# Patient Record
Sex: FEMALE | Race: WHITE | Employment: OTHER | URBAN - METROPOLITAN AREA
[De-identification: names, ages, dates, MRNs, and addresses within clinical notes are randomized per-mention and may not be internally consistent; named-entity substitution may affect disease eponyms.]

---

## 2022-07-18 ENCOUNTER — OFFICE VISIT (OUTPATIENT)
Dept: ORTHOPEDIC SURGERY | Age: 77
End: 2022-07-18
Payer: MEDICARE

## 2022-07-18 VITALS
HEIGHT: 69 IN | WEIGHT: 242 LBS | TEMPERATURE: 97.7 F | HEART RATE: 86 BPM | OXYGEN SATURATION: 96 % | BODY MASS INDEX: 35.84 KG/M2

## 2022-07-18 DIAGNOSIS — M17.12 PRIMARY OSTEOARTHRITIS OF LEFT KNEE: Primary | ICD-10-CM

## 2022-07-18 PROCEDURE — 1123F ACP DISCUSS/DSCN MKR DOCD: CPT | Performed by: PHYSICIAN ASSISTANT

## 2022-07-18 PROCEDURE — 20610 DRAIN/INJ JOINT/BURSA W/O US: CPT | Performed by: PHYSICIAN ASSISTANT

## 2022-07-18 PROCEDURE — 99214 OFFICE O/P EST MOD 30 MIN: CPT | Performed by: PHYSICIAN ASSISTANT

## 2022-07-18 RX ORDER — MULTIVIT WITH MINERALS/LUTEIN
TABLET ORAL
COMMUNITY

## 2022-07-18 RX ORDER — ROSUVASTATIN CALCIUM 5 MG/1
TABLET, COATED ORAL
COMMUNITY
Start: 2022-04-30

## 2022-07-18 RX ORDER — OLMESARTAN MEDOXOMIL AND HYDROCHLOROTHIAZIDE 40/12.5 40; 12.5 MG/1; MG/1
TABLET ORAL
COMMUNITY
Start: 2022-04-30

## 2022-07-18 RX ORDER — AMLODIPINE BESYLATE 10 MG/1
TABLET ORAL
COMMUNITY

## 2022-07-18 RX ORDER — VITAMIN E 268 MG
CAPSULE ORAL
COMMUNITY

## 2022-07-18 RX ORDER — ANASTROZOLE 1 MG/1
TABLET ORAL
COMMUNITY
Start: 2022-01-24

## 2022-07-18 RX ORDER — LEVOTHYROXINE SODIUM 0.05 MG/1
TABLET ORAL
COMMUNITY
Start: 2022-04-30

## 2022-07-18 RX ORDER — TROSPIUM CHLORIDE 20 MG/1
TABLET, FILM COATED ORAL
COMMUNITY
Start: 2022-04-30

## 2022-07-18 RX ORDER — LIDOCAINE HYDROCHLORIDE 10 MG/ML
8 INJECTION, SOLUTION INFILTRATION; PERINEURAL ONCE
Status: COMPLETED | OUTPATIENT
Start: 2022-07-18 | End: 2022-07-18

## 2022-07-18 RX ORDER — TRIAMCINOLONE ACETONIDE 40 MG/ML
80 INJECTION, SUSPENSION INTRA-ARTICULAR; INTRAMUSCULAR ONCE
Status: COMPLETED | OUTPATIENT
Start: 2022-07-18 | End: 2022-07-18

## 2022-07-18 RX ORDER — HYDROCHLOROTHIAZIDE 25 MG/1
TABLET ORAL
COMMUNITY

## 2022-07-18 RX ADMIN — TRIAMCINOLONE ACETONIDE 80 MG: 40 INJECTION, SUSPENSION INTRA-ARTICULAR; INTRAMUSCULAR at 12:13

## 2022-07-18 RX ADMIN — LIDOCAINE HYDROCHLORIDE 8 ML: 10 INJECTION, SOLUTION INFILTRATION; PERINEURAL at 12:10

## 2022-07-18 ASSESSMENT — ENCOUNTER SYMPTOMS
EYES NEGATIVE: 1
RESPIRATORY NEGATIVE: 1
GASTROINTESTINAL NEGATIVE: 1

## 2022-07-18 NOTE — PROGRESS NOTES
A timeout was performed immediately prior to the start of the injection procedure and included the correct patient (two identifiers), correct procedure and correct site(s). Procedure consent and allergies were also verified. Patient's name, date of birth, and allergies have been confirmed. Patient is aware that injection is to be given in Left knee. He/she is aware that they will be seeing [facility] and the injection will be given by him.

## 2022-07-18 NOTE — PROGRESS NOTES
Amy Chaudhary (:  1945) is a 69 y.o. female,Established patient, here for evaluation of the following chief complaint(s):  New Patient (Pt would like a cortisone injection. Pt denies any recent injury)         ASSESSMENT/PLAN:  1. Primary osteoarthritis of left knee  -     [place] ARTHROCENTESIS ASPIR&/INJ MAJOR JT/BURSA W/O US      Return in about 1 week (around 2022) for Viscolubricant injection. Subjective   SUBJECTIVE/OBJECTIVE:  This is a 69-year-old female with complaint of left knee pain. I seen her previously for left knee osteoarthritis. I have injected the knee previously with cortisone. States the knee is swollen today. It has been increasingly more swollen primarily at the end of the day. She is greater than 6 months from most recent cortisone and Visco lubricant injections. She presents today requesting intra-articular cortisone injection of the left knee. Review of Systems   Constitutional: Negative. HENT: Negative. Eyes: Negative. Respiratory: Negative. Gastrointestinal: Negative. Genitourinary: Negative. Musculoskeletal: Negative. Psychiatric/Behavioral: Negative. Objective   IMAGING: Previous imaging performed on May 31, 2021, and available in the [facility] health record, showed lateral compartment degenerative arthritis. Physical Exam  Constitutional:       Appearance: Normal appearance. HENT:      Head: Normocephalic and atraumatic. Mouth/Throat:      Mouth: Mucous membranes are moist.   Eyes:      Extraocular Movements: Extraocular movements intact. Musculoskeletal:      Cervical back: Normal range of motion. Comments: Left knee-joint effusion present. Popliteal cyst present. No warmth, erythema, fluctuance or sign of infection. Full extension, flexion is 130 degrees. The knee joint is stable, there is no laxity with valgus or varus stress. Tenderness with palpation to lateral femoral condyle.   Patellofemoral crepitus with range of motion. Calf is soft and nontender. Skin:     General: Skin is warm and dry. Neurological:      General: No focal deficit present. Mental Status: She is oriented to person, place, and time. Psychiatric:         Mood and Affect: Mood normal.     Time Out  [x] Patient Verified  [x] Site Verified  [x] Laterality Verified  [x] Procedure Verified    Before aspiration/injection risks/benefits of a cortisone injection including infection, local skin irritation, skin atrophy, continued pain/discomfort, elevated blood sugar, burning, failure to relieve pain, possible late infection were discussed with patient. Patient verbalized understanding and wanted to proceed with planned procedure. After prepping and draping the left knee in the usual sterile fashion, 2 cc of 40 mg/ml kenalog with 8 cc of 1% lidocaine were injected intra-articularly after aspiration of 34 cc clear yellow joint fluid without any complications. Patient tolerated this well. Post-procedure discomfort can be alleviated with additional medications/ice/elevation/rest over the first 24 hours as recommended. The patient tolerated the procedure well. If fluid was aspirated it was sent for further studies  in sterile specimen container as ordered to the lab    Explained to the patient that she does have significant degenerative arthritis of the lateral compartment of the left knee. She has responded well deviously to cortisone as well as Visco lubricant. Injected the knee today with cortisone. She will follow-up with me in a week and will proceed with Visco lubricant injection       On this date 7/18/2022 I have spent 25 minutes reviewing previous notes, test results and face to face with the patient discussing the diagnosis and importance of compliance with the treatment plan as well as documenting on the day of the visit. An electronic signature was used to authenticate this note.     --DHAVAL Gallego

## 2022-07-18 NOTE — PATIENT INSTRUCTIONS
Patient Education        Learning About Joint Injections  What are joint injections? Joint injections are shots into a joint, such as the knee or shoulder. They are used to put in medicines, such as pain relievers and steroid medicines. Steroids can help reduce inflammation. A steroid shot can sometimes help withshort-term pain relief when other treatments haven't worked. How are they done? First, the area over the joint will be cleaned. Your doctor may then use a tinyneedle to numb the skin in the area where you will get the joint injection. If a tiny needle is used to numb the area, your doctor will use another needle to inject the medicine. Your doctor may use a pain reliever, a steroid, orboth. You may feel some pressure or discomfort. Your doctor may put ice on the area before you go home. What can you expect after a joint injection? You will probably go home soon after your shot. You may have numbness aroundthe joint for a few hours. If your shot included both a pain reliever and a steroid, then the pain will probably go away right away. But it might come back after a few hours. This might happen if the pain reliever wears off and the steroid hasn't started to work yet. Steroids don't always work. But when they do, the pain relief canlast for several days to a few months or longer. Your doctor may tell you to use ice on the area. You can also use ice if the pain comes back. Put ice or a cold pack on your joint for 10 to 20 minutes at atime. Put a thin cloth between the ice and your skin. Follow your doctor's instructions carefully. Follow-up care is a key part of your treatment and safety. Be sure to make and go to all appointments, and call your doctor if you are having problems. It's also a good idea to know your test results and keep alist of the medicines you take. Where can you learn more? Go to [email or web address] org and sign in to your Quick Key account.  Enter H585 in the Search Health Information box to learn more about \"Learning About Joint Injections. \"     If you do not have an account, please click on the \"Sign Up Now\" link. Current as of: March 9, 2022               Content Version: 13.3  © 4766-9220 [employer]. Care instructions adapted under license by [facility] ([facility]). If you have questions about a medical condition or this instruction, always ask your healthcare professional. [address] any warranty or liability for your use of this information.

## 2022-07-18 NOTE — PROGRESS NOTES
Patient's name, date of birth, and allergies have been confirmed. Patient is aware that injection is to be given in Left knee. He/she is aware that they will be seeing  Dara Mendoza   and the injection will be given by him. A timeout was performed immediately prior to the start of the injection procedure and included the correct patient (two identifiers), correct procedure and correct site(s). Procedure consent and allergies were also verified.

## 2022-07-26 ENCOUNTER — PROCEDURE VISIT (OUTPATIENT)
Dept: ORTHOPEDIC SURGERY | Age: 77
End: 2022-07-26
Payer: MEDICARE

## 2022-07-26 VITALS
HEART RATE: 86 BPM | TEMPERATURE: 97 F | OXYGEN SATURATION: 96 % | HEIGHT: 69 IN | WEIGHT: 242 LBS | BODY MASS INDEX: 35.84 KG/M2

## 2022-07-26 DIAGNOSIS — M17.12 PRIMARY OSTEOARTHRITIS OF LEFT KNEE: Primary | ICD-10-CM

## 2022-07-26 PROCEDURE — 20610 DRAIN/INJ JOINT/BURSA W/O US: CPT | Performed by: PHYSICIAN ASSISTANT

## 2022-07-26 NOTE — PROGRESS NOTES
Patient's name, date of birth, and allergies have been confirmed. Patient is aware that injection is to be given in Left knee. He/she is aware that they will be seeing [facility] and the injection will be given by him.

## 2022-07-26 NOTE — PROGRESS NOTES
Time Out  [x] Patient Verified  [x] Site Verified  [x] Laterality Verified  [x] Procedure Verified    Before aspiration/injection risks/benefits of a Viscolubricant injection including infection, local skin irritation, skin atrophy, continued pain/discomfort, elevated blood sugar, burning, failure to relieve pain, possible late infection were discussed with patient. Patient verbalized understanding and wanted to proceed with planned procedure. After prepping and draping the left knee in the usual sterile fashion,  3 cc of dural Alfredo  were injected intra-articularly after aspirating 16 clear yellow joint fluid without any complications. Patient tolerated this well. Post-procedure discomfort can be alleviated with additional medications/ice/elevation/rest over the first 24 hours as recommended. The patient tolerated the procedure well. If fluid was aspirated that was abnormal it was sent for further studies  in sterile specimen container as ordered to the lab      Diagnosis Orders   1.  Primary osteoarthritis of left knee

## 2023-05-11 ENCOUNTER — TELEPHONE (OUTPATIENT)
Dept: ORTHOPEDIC SURGERY | Age: 78
End: 2023-05-11

## 2023-05-19 ENCOUNTER — PROCEDURE VISIT (OUTPATIENT)
Dept: ORTHOPEDIC SURGERY | Age: 78
End: 2023-05-19

## 2023-05-19 VITALS — HEIGHT: 69 IN | HEART RATE: 78 BPM | BODY MASS INDEX: 35.4 KG/M2 | WEIGHT: 239 LBS | OXYGEN SATURATION: 98 %

## 2023-05-19 DIAGNOSIS — M17.12 PRIMARY OSTEOARTHRITIS OF LEFT KNEE: Primary | ICD-10-CM

## 2023-05-19 RX ORDER — OLMESARTAN MEDOXOMIL 20 MG/1
20 TABLET ORAL DAILY
COMMUNITY
Start: 2023-04-14

## 2023-05-19 RX ORDER — TAFLUPROST 0 MG/.3ML
SOLUTION/ DROPS OPHTHALMIC
COMMUNITY
Start: 2023-04-21

## 2023-05-19 RX ORDER — DORZOLAMIDE HYDROCHLORIDE AND TIMOLOL MALEATE PRESERVATIVE FREE 20; 5 MG/ML; MG/ML
SOLUTION/ DROPS OPHTHALMIC
COMMUNITY
Start: 2023-05-18

## 2023-05-19 RX ORDER — AMMONIUM LACTATE 12 G/100G
LOTION TOPICAL
COMMUNITY
Start: 2023-04-14

## 2023-05-19 RX ORDER — ASPIRIN 81 MG/1
81 TABLET ORAL DAILY
COMMUNITY

## 2023-05-19 NOTE — PROGRESS NOTES
Time Out  [x] Patient Verified  [x] Site Verified  [x] Laterality Verified  [x] Procedure Verified    Before aspiration/injection risks/benefits of a Viscolubricant injection including infection, local skin irritation, skin atrophy, continued pain/discomfort, elevated blood sugar, burning, failure to relieve pain, possible late infection were discussed with patient. Patient verbalized understanding and wanted to proceed with planned procedure. After prepping and draping the left knee in the usual sterile fashion,  3 cc Durolane  were injected intra-articularly after aspirating 14 clear yellow joint fluid without any complications. Patient tolerated this well. Post-procedure discomfort can be alleviated with additional medications/ice/elevation/rest over the first 24 hours as recommended. The patient tolerated the procedure well. If fluid was aspirated that was abnormal it was sent for further studies  in sterile specimen container as ordered to the lab     Diagnosis Orders   1. Primary osteoarthritis of left knee  [place] ARTHROCENTESIS ASPIR&/INJ MAJOR JT/BURSA W/O US    [place] ARTHROCENTESIS ASPIR&/INJ MAJOR JT/BURSA W/O US        Time Out  [x] Patient Verified  [x] Site Verified  [x] Laterality Verified  [x] Procedure Verified    Before aspiration/injection risks/benefits of a Viscolubricant injection including infection, local skin irritation, skin atrophy, continued pain/discomfort, elevated blood sugar, burning, failure to relieve pain, possible late infection were discussed with patient. Patient verbalized understanding and wanted to proceed with planned procedure. After prepping and draping the right knee in the usual sterile fashion,  3 cc Durolane  were injected intra-articularly after aspirating 18 cc clear yellow joint fluid without any complications. Patient tolerated this well.     Post-procedure discomfort can be alleviated with additional medications/ice/elevation/rest over the first 24

## 2024-10-12 ENCOUNTER — OFFICE VISIT (OUTPATIENT)
Dept: URGENT CARE | Age: 79
End: 2024-10-12
Payer: MEDICARE

## 2024-10-12 VITALS
TEMPERATURE: 97.8 F | WEIGHT: 240 LBS | OXYGEN SATURATION: 96 % | RESPIRATION RATE: 19 BRPM | DIASTOLIC BLOOD PRESSURE: 63 MMHG | HEART RATE: 74 BPM | SYSTOLIC BLOOD PRESSURE: 131 MMHG

## 2024-10-12 DIAGNOSIS — J02.9 SORE THROAT: ICD-10-CM

## 2024-10-12 DIAGNOSIS — B37.0 THRUSH: Primary | ICD-10-CM

## 2024-10-12 DIAGNOSIS — Z20.822 COVID-19 RULED OUT: ICD-10-CM

## 2024-10-12 LAB — POC SARS-COV-2 AG BINAX: NORMAL

## 2024-10-12 RX ORDER — MUPIROCIN 20 MG/G
OINTMENT TOPICAL
COMMUNITY
Start: 2023-11-29

## 2024-10-12 RX ORDER — AMOXICILLIN 500 MG/1
TABLET, FILM COATED ORAL
COMMUNITY
Start: 2024-03-18

## 2024-10-12 RX ORDER — CYCLOBENZAPRINE HCL 10 MG
1 TABLET ORAL 3 TIMES DAILY PRN
COMMUNITY
Start: 2014-09-26

## 2024-10-12 RX ORDER — OLMESARTAN MEDOXOMIL 5 MG/1
1 TABLET ORAL DAILY
COMMUNITY
Start: 2024-10-03

## 2024-10-12 RX ORDER — MELOXICAM 15 MG/1
TABLET ORAL
COMMUNITY
Start: 2023-12-15

## 2024-10-12 RX ORDER — TAFLUPROST OPTHALMIC 0 MG/.3ML
SOLUTION/ DROPS OPHTHALMIC
COMMUNITY

## 2024-10-12 RX ORDER — ASPIRIN 81 MG/1
81 TABLET ORAL DAILY
COMMUNITY

## 2024-10-12 RX ORDER — AMLODIPINE BESYLATE 10 MG/1
TABLET ORAL
COMMUNITY

## 2024-10-12 RX ORDER — HYDROCHLOROTHIAZIDE 25 MG/1
TABLET ORAL
COMMUNITY

## 2024-10-12 RX ORDER — ROSUVASTATIN CALCIUM 5 MG/1
1 TABLET, COATED ORAL NIGHTLY
COMMUNITY
Start: 2024-05-01

## 2024-10-12 RX ORDER — OLMESARTAN MEDOXOMIL 20 MG/1
1 TABLET ORAL DAILY
COMMUNITY
Start: 2023-04-14

## 2024-10-12 RX ORDER — DOXYCYCLINE 100 MG/1
1 TABLET ORAL
COMMUNITY
Start: 2023-12-26

## 2024-10-12 RX ORDER — CLOTRIMAZOLE 10 MG/1
10 LOZENGE ORAL
Qty: 50 TROCHE | Refills: 1 | Status: SHIPPED | OUTPATIENT
Start: 2024-10-12 | End: 2024-10-22

## 2024-10-12 RX ORDER — IMIQUIMOD 12.5 MG/.25G
CREAM TOPICAL
COMMUNITY
Start: 2024-09-25

## 2024-10-12 RX ORDER — DORZOLAMIDE HYDROCHLORIDE AND TIMOLOL MALEATE PRESERVATIVE FREE 20; 5 MG/ML; MG/ML
1 SOLUTION/ DROPS OPHTHALMIC 2 TIMES DAILY
COMMUNITY
Start: 2023-05-18

## 2024-10-12 RX ORDER — ANASTROZOLE 1 MG/1
1 TABLET ORAL DAILY
COMMUNITY
Start: 2024-08-19

## 2024-10-12 RX ORDER — PANTOPRAZOLE SODIUM 20 MG/1
TABLET, DELAYED RELEASE ORAL
COMMUNITY
Start: 2023-12-15

## 2024-10-12 RX ORDER — OXYCODONE HYDROCHLORIDE 5 MG/1
TABLET ORAL
COMMUNITY
Start: 2023-12-26

## 2024-10-12 RX ORDER — ASPIRIN 81 MG/1
1 TABLET ORAL DAILY
COMMUNITY

## 2024-10-12 RX ORDER — OLMESARTAN MEDOXOMIL AND HYDROCHLOROTHIAZIDE 40/12.5 40; 12.5 MG/1; MG/1
TABLET ORAL
COMMUNITY

## 2024-10-12 RX ORDER — TROSPIUM CHLORIDE 20 MG/1
1 TABLET, FILM COATED ORAL 2 TIMES DAILY
COMMUNITY
Start: 2024-05-01

## 2024-10-12 RX ORDER — AMOXICILLIN 500 MG/1
CAPSULE ORAL
COMMUNITY
Start: 2024-05-29

## 2024-10-12 RX ORDER — NAPROXEN SODIUM 220 MG/1
TABLET, FILM COATED ORAL 2 TIMES DAILY
COMMUNITY
Start: 2023-12-15

## 2024-10-12 RX ORDER — LEVOTHYROXINE SODIUM 50 UG/1
TABLET ORAL
COMMUNITY
Start: 2024-05-01

## 2024-10-12 RX ORDER — MULTIVITAMIN
1 TABLET ORAL
COMMUNITY
Start: 2023-12-13

## 2024-10-12 RX ORDER — VIT C/E/ZN/COPPR/LUTEIN/ZEAXAN 250MG-90MG
CAPSULE ORAL
COMMUNITY

## 2024-10-12 RX ORDER — POLYETHYLENE GLYCOL 3350 17 G/17G
17 POWDER, FOR SOLUTION ORAL DAILY
COMMUNITY
Start: 2023-12-17

## 2024-10-12 RX ORDER — MIRABEGRON 25 MG/1
1 TABLET, FILM COATED, EXTENDED RELEASE ORAL
COMMUNITY
Start: 2023-10-30

## 2024-10-12 RX ORDER — DOCUSATE SODIUM 100 MG/1
CAPSULE, LIQUID FILLED ORAL
COMMUNITY
Start: 2023-12-15

## 2024-10-12 RX ORDER — ACETAMINOPHEN 500 MG
TABLET ORAL EVERY 8 HOURS PRN
COMMUNITY
Start: 2023-12-28

## 2024-10-12 ASSESSMENT — ENCOUNTER SYMPTOMS
SWOLLEN GLANDS: 1
SORE THROAT: 1
TROUBLE SWALLOWING: 1

## 2024-10-12 ASSESSMENT — PAIN SCALES - GENERAL: PAINLEVEL: 4

## 2024-10-12 NOTE — PATIENT INSTRUCTIONS
Please follow up with your primary provider within one week if symptoms do not improve.  You may schedule an appointment online at [email or web address] or call [phone]. Go to the Emergency Department if symptoms significantly worsen

## 2024-10-12 NOTE — PROGRESS NOTES
Subjective   Patient ID: Babs Sequeira is a 79 y.o. female. They present today with a chief complaint of Sore Throat (Sore throat 4-5 days).    History of Present Illness  Reports worsening sore throat and swollen lymph nodes x3 days. Denies fever, cough, nasal symptoms, known exposures. Remains able to eat and drink.      Sore Throat   This is a new problem. The current episode started in the past 7 days. The problem has been gradually worsening. The pain is worse on the left side. There has been no fever. The pain is at a severity of 5/10. The pain is mild. Associated symptoms include swollen glands and trouble swallowing.       Past Medical History  Allergies as of 10/12/2024    (No Known Allergies)       (Not in a hospital admission)       No past medical history on file.    No past surgical history on file.     reports that she has never smoked. She has never used smokeless tobacco.    Review of Systems  Pertinent systems reviewed and were negative unless otherwise stated in HPI.    Objective    Vitals:    10/12/24 1100   BP: 131/63   BP Location: Left arm   Patient Position: Sitting   BP Cuff Size: Large adult   Pulse: 74   Resp: 19   Temp: 36.6 °C (97.8 °F)   TempSrc: Oral   SpO2: 96%   Weight: 109 kg (240 lb)     No LMP recorded.    Physical Exam  Constitutional:       General: She is not in acute distress.  HENT:      Right Ear: Tympanic membrane, ear canal and external ear normal.      Left Ear: Tympanic membrane, ear canal and external ear normal.      Nose: Nose normal.      Mouth/Throat:      Mouth: Mucous membranes are moist.      Pharynx: Oropharyngeal exudate (white coating on tongue and scattered white spots adherent to soft palate) and posterior oropharyngeal erythema present.      Tonsils: No tonsillar exudate.   Eyes:      General:         Right eye: No discharge.         Left eye: No discharge.      Conjunctiva/sclera: Conjunctivae normal.   Cardiovascular:      Rate and Rhythm: Normal rate  and regular rhythm.   Pulmonary:      Effort: Pulmonary effort is normal.      Breath sounds: Normal breath sounds.   Musculoskeletal:      Cervical back: Normal range of motion.   Lymphadenopathy:      Cervical: Cervical adenopathy present.   Skin:     Findings: No rash.         Diagnostic study results (if any) were reviewed by Pasquale Gant PA-C.    Assessment/Plan   Allergies, medications, history, and pertinent labs/EKGs/imaging reviewed by Pasquale Gant PA-C.     Medical Decision Making  Candidal stomatitis most likely. Dry mouth likely predisposing factor. Low concern for strep, PTA. Low concern for systemic illness.     Orders and Diagnoses  Diagnoses and all orders for this visit:  Thrush  Sore throat  COVID-19 ruled out      Medical Admin Record      Disposition: Home    Electronically signed by Pasquale Gant PA-C

## 2025-07-13 ENCOUNTER — OFFICE VISIT (OUTPATIENT)
Dept: URGENT CARE | Age: 80
End: 2025-07-13
Payer: MEDICARE

## 2025-07-13 VITALS
RESPIRATION RATE: 16 BRPM | HEIGHT: 69 IN | WEIGHT: 239 LBS | HEART RATE: 90 BPM | DIASTOLIC BLOOD PRESSURE: 67 MMHG | TEMPERATURE: 98.2 F | OXYGEN SATURATION: 98 % | BODY MASS INDEX: 35.4 KG/M2 | SYSTOLIC BLOOD PRESSURE: 140 MMHG

## 2025-07-13 DIAGNOSIS — R21 RASH AND OTHER NONSPECIFIC SKIN ERUPTION: Primary | ICD-10-CM

## 2025-07-13 RX ORDER — PREDNISONE 20 MG/1
20 TABLET ORAL DAILY
Qty: 10 TABLET | Refills: 0 | Status: SHIPPED | OUTPATIENT
Start: 2025-07-13 | End: 2025-07-23

## 2025-07-13 RX ORDER — TRIAMCINOLONE ACETONIDE 1 MG/G
CREAM TOPICAL 2 TIMES DAILY PRN
Qty: 30 G | Refills: 0 | Status: SHIPPED | OUTPATIENT
Start: 2025-07-13

## 2025-07-13 ASSESSMENT — ENCOUNTER SYMPTOMS
HEADACHES: 0
TROUBLE SWALLOWING: 0
JOINT SWELLING: 0
NAUSEA: 0
VOMITING: 0
ARTHRALGIAS: 0
STRIDOR: 0
WHEEZING: 0
ABDOMINAL PAIN: 0
SHORTNESS OF BREATH: 0
FEVER: 0
CHILLS: 0
CHEST TIGHTNESS: 0
COUGH: 0

## 2025-07-13 NOTE — PROGRESS NOTES
"Subjective   Patient ID: Babs Sequeira is a 80 y.o. female. They present today with a chief complaint of Rash (Rash--sx 2 weeks.  Located on back, neck, rt arm and rt buttock area.).    History of Present Illness  This is a 80-year-old female who presents to the clinic today for evaluation of rash.  Patient states has been going on for a few weeks.  It first started on the back of her right arm.  States that area has gotten better but remains itching.  States she has new rash on the back of her neck and on her right side/buttock.  Patient did see a bug in her bed therefore she called an  who stated it was not a bedbug.  Friend who saw the rash on her back yesterday said they were \"welts\".  Both have gotten better.  Denies new soaps, lotions, medications, pets, foods.  Thinks it may be insect bites [place].  Denies fever, chills, overall feeling unwell.  Denies increased work of breathing, use of accessory muscle, cyanosis, apnea, wheezing, stridor, shortness of breath.  Denies change in bowel or bladder function.  Denies GI complaints.        Rash  Pertinent negatives include no cough, fever, shortness of breath or vomiting.       Past Medical History  Allergies as of 07/13/2025    (No Known Allergies)       Prescriptions Prior to Admission[1]     Medical History[2]    Surgical History[3]     reports that she has never smoked. She has never used smokeless tobacco.    Review of Systems  Review of Systems   Constitutional:  Negative for chills and fever.   HENT:  Negative for trouble swallowing.    Respiratory:  Negative for cough, chest tightness, shortness of breath, wheezing and stridor.    Cardiovascular:  Negative for chest pain.   Gastrointestinal:  Negative for abdominal pain, nausea and vomiting.   Musculoskeletal:  Negative for arthralgias and joint swelling.   Skin:  Positive for rash.   Neurological:  Negative for headaches.        " "                          Objective    Vitals:    07/13/25 1753   BP: 140/67   Pulse: 90   Resp: 16   Temp: 36.8 °C (98.2 °F)   SpO2: 98%   Weight: 108 kg (239 lb)   Height: 1.753 m (5' 9\")     No LMP recorded.    Physical Exam  Constitutional:       Appearance: Normal appearance.   HENT:      Head: Normocephalic and atraumatic.      Right Ear: Tympanic membrane, ear canal and external ear normal.      Left Ear: Tympanic membrane, ear canal and external ear normal.      Nose: Nose normal.      Mouth/Throat:      Mouth: Mucous membranes are moist.   Eyes:      Extraocular Movements: Extraocular movements intact.      Conjunctiva/sclera: Conjunctivae normal.      Pupils: Pupils are equal, round, and reactive to light.   Cardiovascular:      Rate and Rhythm: Normal rate and regular rhythm.      Pulses: Normal pulses.      Heart sounds: Normal heart sounds.   Pulmonary:      Effort: Pulmonary effort is normal.      Breath sounds: Normal breath sounds.   Musculoskeletal:         General: Normal range of motion.      Cervical back: Normal range of motion and neck supple.   Skin:     General: Skin is warm and dry.      Findings: Rash present.   Neurological:      General: No focal deficit present.      Mental Status: She is alert and oriented to person, place, and time.         Procedures    Point of Care Test & Imaging Results from this visit  No results found for this visit on 07/13/25.   Imaging  No results found.    Cardiology, Vascular, and Other Imaging  No other imaging results found for the past 2 days      Diagnostic study results (if any) were reviewed by Kristin L Schoenlein, APRN-CNP.    Assessment/Plan   Allergies, medications, history, and pertinent labs/EKGs/Imaging reviewed by Kristin L Schoenlein, APRN-CNP.     Medical Decision Making  VSS, NAD, Nontoxic appearing.  Patient has nonspecific erythematous macular rash noted on areas as documented above.  It crosses midline.  There is no scaling, oozing, " crusting, burrowing, signs and symptoms of infection.  I do not appreciate any punctum  or retained insect parts.  Physical exam as documented above.     Symptoms, history, and exam are consistent with: Contact dermatitis of unknown origin.  Supportive care, Follow up, and Strict ED precautions reviewed.     Differential Dx include, however, are not limited to:Cellulitis, Tinea, Zoster, erythema migraine, insect bite, scabies, bedbugs    I have a low suspicion for any acute pathologies requiring emergent evaluation and further workup at this time.  I believe patient is safe to discharge home with a low threshold for emergency room as discussed during visit.  Medication(s) profile of OTC and Rxed medication(s) if prescribed was (were) reviewed.  All questions answered and addressed.  Patient verbalized understanding.      Orders and Diagnoses  Diagnoses and all orders for this visit:  Rash and other nonspecific skin eruption  -     predniSONE (Deltasone) 20 mg tablet; Take 1 tablet (20 mg) by mouth once daily for 10 days.  -     triamcinolone (Kenalog) 0.1 % cream; Apply topically 2 times a day as needed for rash. For up to 2 weeks then stop      Medical Admin Record      Patient disposition: Home    Electronically signed by Kristin L Schoenlein, APRN-CNP  6:22 PM           [1] (Not in a hospital admission)   [2] History reviewed. No pertinent past medical history.  [3] History reviewed. No pertinent surgical history.

## 2025-07-13 NOTE — PATIENT INSTRUCTIONS
Thank you for letting me care for you today.    You have been seen today for a rash    Start prednisone and cream     [facility]  [address], [place] [address] [phone]    Please follow up with your primary care provider/pediatrician/specialist as directed.     Please seek care in emergency room for red flags as discussed during visit.    No additional NSAIDs while taking steroid (ibuprofen, Motrin, naproxen, Aleve, aspirin, Motrin)